# Patient Record
Sex: MALE | Race: BLACK OR AFRICAN AMERICAN | NOT HISPANIC OR LATINO | Employment: UNEMPLOYED | URBAN - METROPOLITAN AREA
[De-identification: names, ages, dates, MRNs, and addresses within clinical notes are randomized per-mention and may not be internally consistent; named-entity substitution may affect disease eponyms.]

---

## 2018-11-15 ENCOUNTER — HOSPITAL ENCOUNTER (OUTPATIENT)
Facility: HOSPITAL | Age: 38
Discharge: HOME | DRG: 917 | End: 2018-11-16
Attending: EMERGENCY MEDICINE | Admitting: INTERNAL MEDICINE
Payer: MEDICAID

## 2018-11-15 DIAGNOSIS — F19.10 POLYSUBSTANCE ABUSE: ICD-10-CM

## 2018-11-15 DIAGNOSIS — G62.9 NEUROPATHY: ICD-10-CM

## 2018-11-15 DIAGNOSIS — G93.40 ACUTE ENCEPHALOPATHY: Primary | ICD-10-CM

## 2018-11-15 DIAGNOSIS — T40.2X1A OPIOID OVERDOSE, ACCIDENTAL OR UNINTENTIONAL, INITIAL ENCOUNTER: ICD-10-CM

## 2018-11-15 DIAGNOSIS — Z72.0 TOBACCO ABUSE: ICD-10-CM

## 2018-11-15 DIAGNOSIS — D64.9 NORMOCYTIC ANEMIA: ICD-10-CM

## 2018-11-15 DIAGNOSIS — M54.32 SCIATICA OF LEFT SIDE: ICD-10-CM

## 2018-11-15 DIAGNOSIS — T40.2X1A OPIOID OVERDOSE: ICD-10-CM

## 2018-11-15 DIAGNOSIS — R41.82 ALTERED MENTAL STATUS: ICD-10-CM

## 2018-11-15 LAB
ALBUMIN SERPL BCP-MCNC: 4 G/DL
ALP SERPL-CCNC: 74 U/L
ALT SERPL W/O P-5'-P-CCNC: 21 U/L
AMPHET+METHAMPHET UR QL: NEGATIVE
ANION GAP SERPL CALC-SCNC: 7 MMOL/L
APAP SERPL-MCNC: <3 UG/ML
AST SERPL-CCNC: 26 U/L
BARBITURATES UR QL SCN>200 NG/ML: NEGATIVE
BASOPHILS # BLD AUTO: 0.02 K/UL
BASOPHILS NFR BLD: 0.4 %
BENZODIAZ UR QL SCN>200 NG/ML: NEGATIVE
BILIRUB SERPL-MCNC: 0.7 MG/DL
BUN SERPL-MCNC: 10 MG/DL
BZE UR QL SCN: NORMAL
CALCIUM SERPL-MCNC: 9 MG/DL
CANNABINOIDS UR QL SCN: NEGATIVE
CHLORIDE SERPL-SCNC: 106 MMOL/L
CO2 SERPL-SCNC: 27 MMOL/L
CREAT SERPL-MCNC: 1.2 MG/DL
CREAT UR-MCNC: 162 MG/DL
DIFFERENTIAL METHOD: ABNORMAL
EOSINOPHIL # BLD AUTO: 0.1 K/UL
EOSINOPHIL NFR BLD: 2.5 %
ERYTHROCYTE [DISTWIDTH] IN BLOOD BY AUTOMATED COUNT: 12.8 %
EST. GFR  (AFRICAN AMERICAN): >60 ML/MIN/1.73 M^2
EST. GFR  (NON AFRICAN AMERICAN): >60 ML/MIN/1.73 M^2
ETHANOL SERPL-MCNC: <10 MG/DL
GLUCOSE SERPL-MCNC: 96 MG/DL
HCT VFR BLD AUTO: 35.2 %
HGB BLD-MCNC: 11.3 G/DL
LYMPHOCYTES # BLD AUTO: 1.6 K/UL
LYMPHOCYTES NFR BLD: 33.1 %
MCH RBC QN AUTO: 27.6 PG
MCHC RBC AUTO-ENTMCNC: 32.1 G/DL
MCV RBC AUTO: 86 FL
METHADONE UR QL SCN>300 NG/ML: NEGATIVE
MONOCYTES # BLD AUTO: 0.6 K/UL
MONOCYTES NFR BLD: 12.1 %
NEUTROPHILS # BLD AUTO: 2.5 K/UL
NEUTROPHILS NFR BLD: 51.7 %
OPIATES UR QL SCN: NORMAL
PCP UR QL SCN>25 NG/ML: NEGATIVE
PLATELET # BLD AUTO: 208 K/UL
PMV BLD AUTO: 11.3 FL
POCT GLUCOSE: 112 MG/DL (ref 70–110)
POTASSIUM SERPL-SCNC: 3.9 MMOL/L
PROT SERPL-MCNC: 6.4 G/DL
RBC # BLD AUTO: 4.1 M/UL
SALICYLATES SERPL-MCNC: <5 MG/DL
SODIUM SERPL-SCNC: 140 MMOL/L
TOXICOLOGY INFORMATION: NORMAL
TSH SERPL DL<=0.005 MIU/L-ACNC: 0.99 UIU/ML
WBC # BLD AUTO: 4.8 K/UL

## 2018-11-15 PROCEDURE — 63600175 PHARM REV CODE 636 W HCPCS: Performed by: NURSE PRACTITIONER

## 2018-11-15 PROCEDURE — 20000000 HC ICU ROOM

## 2018-11-15 PROCEDURE — 85025 COMPLETE CBC W/AUTO DIFF WBC: CPT

## 2018-11-15 PROCEDURE — 80320 DRUG SCREEN QUANTALCOHOLS: CPT

## 2018-11-15 PROCEDURE — 80307 DRUG TEST PRSMV CHEM ANLYZR: CPT

## 2018-11-15 PROCEDURE — 80053 COMPREHEN METABOLIC PANEL: CPT

## 2018-11-15 PROCEDURE — 25000003 PHARM REV CODE 250: Performed by: EMERGENCY MEDICINE

## 2018-11-15 PROCEDURE — 80329 ANALGESICS NON-OPIOID 1 OR 2: CPT

## 2018-11-15 PROCEDURE — G0378 HOSPITAL OBSERVATION PER HR: HCPCS

## 2018-11-15 PROCEDURE — 96375 TX/PRO/DX INJ NEW DRUG ADDON: CPT

## 2018-11-15 PROCEDURE — 82728 ASSAY OF FERRITIN: CPT

## 2018-11-15 PROCEDURE — 96372 THER/PROPH/DIAG INJ SC/IM: CPT

## 2018-11-15 PROCEDURE — 96365 THER/PROPH/DIAG IV INF INIT: CPT

## 2018-11-15 PROCEDURE — 83540 ASSAY OF IRON: CPT

## 2018-11-15 PROCEDURE — 93005 ELECTROCARDIOGRAM TRACING: CPT

## 2018-11-15 PROCEDURE — 63600175 PHARM REV CODE 636 W HCPCS: Performed by: EMERGENCY MEDICINE

## 2018-11-15 PROCEDURE — 84443 ASSAY THYROID STIM HORMONE: CPT

## 2018-11-15 PROCEDURE — 96376 TX/PRO/DX INJ SAME DRUG ADON: CPT

## 2018-11-15 PROCEDURE — 96361 HYDRATE IV INFUSION ADD-ON: CPT

## 2018-11-15 PROCEDURE — 83036 HEMOGLOBIN GLYCOSYLATED A1C: CPT

## 2018-11-15 PROCEDURE — 99285 EMERGENCY DEPT VISIT HI MDM: CPT | Mod: 25

## 2018-11-15 RX ORDER — GLUCAGON 1 MG
1 KIT INJECTION
Status: DISCONTINUED | OUTPATIENT
Start: 2018-11-15 | End: 2018-11-16 | Stop reason: HOSPADM

## 2018-11-15 RX ORDER — IBUPROFEN 200 MG
24 TABLET ORAL
Status: DISCONTINUED | OUTPATIENT
Start: 2018-11-15 | End: 2018-11-16 | Stop reason: HOSPADM

## 2018-11-15 RX ORDER — SODIUM CHLORIDE 0.9 % (FLUSH) 0.9 %
5 SYRINGE (ML) INJECTION
Status: DISCONTINUED | OUTPATIENT
Start: 2018-11-15 | End: 2018-11-16 | Stop reason: HOSPADM

## 2018-11-15 RX ORDER — NALOXONE HCL 0.4 MG/ML
0.4 VIAL (ML) INJECTION
Status: COMPLETED | OUTPATIENT
Start: 2018-11-15 | End: 2018-11-15

## 2018-11-15 RX ORDER — IBUPROFEN 200 MG
16 TABLET ORAL
Status: DISCONTINUED | OUTPATIENT
Start: 2018-11-15 | End: 2018-11-16 | Stop reason: HOSPADM

## 2018-11-15 RX ADMIN — NALOXONE HYDROCHLORIDE 0.4 MG/HR: 1 INJECTION PARENTERAL at 09:11

## 2018-11-15 RX ADMIN — NALOXONE HYDROCHLORIDE 0.4 MG: 0.4 INJECTION, SOLUTION INTRAMUSCULAR; INTRAVENOUS; SUBCUTANEOUS at 07:11

## 2018-11-15 RX ADMIN — SODIUM CHLORIDE 1000 ML: 0.9 INJECTION, SOLUTION INTRAVENOUS at 04:11

## 2018-11-15 RX ADMIN — NALOXONE HYDROCHLORIDE 0.4 MG: 0.4 INJECTION, SOLUTION INTRAMUSCULAR; INTRAVENOUS; SUBCUTANEOUS at 04:11

## 2018-11-15 RX ADMIN — NALOXONE HYDROCHLORIDE 0.4 MG: 0.4 INJECTION, SOLUTION INTRAMUSCULAR; INTRAVENOUS; SUBCUTANEOUS at 05:11

## 2018-11-15 NOTE — ED NOTES
Pt presents to the ED w/ c/o of overdose. Pt arrives via personal transport unresponsive. Pt unresponsive to painful stimuli. Wife with pt reports that pt did heroin 1hr pta. Dr. Ordonez at bedside at this time.

## 2018-11-15 NOTE — ED NOTES
Pt is drowsy. Pt connected to cardiac monitor. Wife and mother at bedside. Wife states family can come to pt's room but advised not to give any medical information to family members.
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
## 2018-11-15 NOTE — ED NOTES
3rd dose of 0.4mg of narcan given at this time per Dr. Ordonez verbal order. Pt is drowsy. Pt responds to verbal stimulation.

## 2018-11-15 NOTE — ED NOTES
Pt is drowsy/sleeping at this time. Pt vitals stable. Dr. Ordonez at bedside. Will continue to monitor pt at this time.

## 2018-11-16 VITALS
SYSTOLIC BLOOD PRESSURE: 139 MMHG | HEIGHT: 68 IN | BODY MASS INDEX: 24.72 KG/M2 | DIASTOLIC BLOOD PRESSURE: 78 MMHG | OXYGEN SATURATION: 99 % | RESPIRATION RATE: 14 BRPM | WEIGHT: 163.13 LBS | TEMPERATURE: 98 F | HEART RATE: 77 BPM

## 2018-11-16 LAB
ALBUMIN SERPL BCP-MCNC: 3.8 G/DL
ALP SERPL-CCNC: 70 U/L
ALT SERPL W/O P-5'-P-CCNC: 18 U/L
ANION GAP SERPL CALC-SCNC: 8 MMOL/L
AST SERPL-CCNC: 21 U/L
BASOPHILS # BLD AUTO: 0.01 K/UL
BASOPHILS NFR BLD: 0.2 %
BILIRUB SERPL-MCNC: 1.4 MG/DL
BUN SERPL-MCNC: 10 MG/DL
CALCIUM SERPL-MCNC: 9.1 MG/DL
CHLORIDE SERPL-SCNC: 108 MMOL/L
CO2 SERPL-SCNC: 26 MMOL/L
CREAT SERPL-MCNC: 1.2 MG/DL
DIFFERENTIAL METHOD: ABNORMAL
EOSINOPHIL # BLD AUTO: 0.1 K/UL
EOSINOPHIL NFR BLD: 2.3 %
ERYTHROCYTE [DISTWIDTH] IN BLOOD BY AUTOMATED COUNT: 12.8 %
EST. GFR  (AFRICAN AMERICAN): >60 ML/MIN/1.73 M^2
EST. GFR  (NON AFRICAN AMERICAN): >60 ML/MIN/1.73 M^2
ESTIMATED AVG GLUCOSE: 103 MG/DL
FERRITIN SERPL-MCNC: 58 NG/ML
FOLATE SERPL-MCNC: 11.7 NG/ML
GLUCOSE SERPL-MCNC: 91 MG/DL
HBA1C MFR BLD HPLC: 5.2 %
HCT VFR BLD AUTO: 36.3 %
HGB BLD-MCNC: 11.8 G/DL
IRON SERPL-MCNC: 82 UG/DL
LYMPHOCYTES # BLD AUTO: 1.2 K/UL
LYMPHOCYTES NFR BLD: 25.8 %
MCH RBC QN AUTO: 27.8 PG
MCHC RBC AUTO-ENTMCNC: 32.5 G/DL
MCV RBC AUTO: 86 FL
MONOCYTES # BLD AUTO: 0.4 K/UL
MONOCYTES NFR BLD: 9.2 %
NEUTROPHILS # BLD AUTO: 3 K/UL
NEUTROPHILS NFR BLD: 62.5 %
PLATELET # BLD AUTO: 207 K/UL
PMV BLD AUTO: 11 FL
POTASSIUM SERPL-SCNC: 3.5 MMOL/L
PROT SERPL-MCNC: 6 G/DL
RBC # BLD AUTO: 4.24 M/UL
SATURATED IRON: 24 %
SODIUM SERPL-SCNC: 142 MMOL/L
TOTAL IRON BINDING CAPACITY: 343 UG/DL
TRANSFERRIN SERPL-MCNC: 232 MG/DL
VIT B12 SERPL-MCNC: 417 PG/ML
WBC # BLD AUTO: 4.77 K/UL

## 2018-11-16 PROCEDURE — 80053 COMPREHEN METABOLIC PANEL: CPT

## 2018-11-16 PROCEDURE — 36415 COLL VENOUS BLD VENIPUNCTURE: CPT

## 2018-11-16 PROCEDURE — 63600175 PHARM REV CODE 636 W HCPCS: Performed by: EMERGENCY MEDICINE

## 2018-11-16 PROCEDURE — 85025 COMPLETE CBC W/AUTO DIFF WBC: CPT

## 2018-11-16 PROCEDURE — 25000003 PHARM REV CODE 250: Performed by: EMERGENCY MEDICINE

## 2018-11-16 PROCEDURE — 82607 VITAMIN B-12: CPT

## 2018-11-16 PROCEDURE — 82746 ASSAY OF FOLIC ACID SERUM: CPT

## 2018-11-16 PROCEDURE — 94761 N-INVAS EAR/PLS OXIMETRY MLT: CPT

## 2018-11-16 PROCEDURE — G0378 HOSPITAL OBSERVATION PER HR: HCPCS

## 2018-11-16 RX ORDER — GABAPENTIN 300 MG/1
900 CAPSULE ORAL 3 TIMES DAILY
Qty: 270 CAPSULE | Refills: 11 | Status: SHIPPED | OUTPATIENT
Start: 2018-11-16 | End: 2019-11-16

## 2018-11-16 RX ADMIN — NALOXONE HYDROCHLORIDE 0.6 MG/HR: 1 INJECTION PARENTERAL at 01:11

## 2018-11-16 RX ADMIN — NALOXONE HYDROCHLORIDE 0.6 MG/HR: 1 INJECTION PARENTERAL at 04:11

## 2018-11-16 NOTE — ED NOTES
Siderails up x 2.  Callbell within reach. Pt on cardiac monitor, automatic blood pressure cuff and pulse ox.

## 2018-11-16 NOTE — PLAN OF CARE
Problem: Patient Care Overview  Goal: Plan of Care Review  Outcome: Ongoing (interventions implemented as appropriate)   11/16/18 1670   Coping/Psychosocial   Plan Of Care Reviewed With patient   Patient received from ED this shift. Drowsy, but easily aroused and oriented. Remains on narcan gtt. No resp distress on room air. Vitals stable. No family at bedside. Continuous monitoring maintained.

## 2018-11-16 NOTE — NURSING
Upon assessment, pt IV that was infusing narcan gtt found to be out. Linens wet. New IV started. Notified Dr Newby of patient being off of narcan gtt for period of time (possibly 1.5 hrs since last assessment) and doing well. Will hold for now and evaluate.

## 2018-11-16 NOTE — ED PROVIDER NOTES
Encounter Date: 11/15/2018       History     Chief Complaint   Patient presents with    Drug Overdose     37 year old male presents to ed unresponsive after heroin use 1 hour pta.      HPI  Review of patient's allergies indicates:   Allergen Reactions    Bee pollens      Past Medical History:   Diagnosis Date    Asthma     Neuropathy     Renal disorder     Stroke      Past Surgical History:   Procedure Laterality Date    ABDOMINAL SURGERY       History reviewed. No pertinent family history.  Social History     Tobacco Use    Smoking status: Current Every Day Smoker     Packs/day: 0.50   Substance Use Topics    Alcohol use: No    Drug use: No     Review of Systems    Physical Exam     Initial Vitals [11/15/18 1645]   BP Pulse Resp Temp SpO2   (!) 155/84 (!) 130 20 98 °F (36.7 °C) 100 %      MAP       --         Physical Exam    ED Course   Procedures  Labs Reviewed   CBC W/ AUTO DIFFERENTIAL - Abnormal; Notable for the following components:       Result Value    RBC 4.10 (*)     Hemoglobin 11.3 (*)     Hematocrit 35.2 (*)     All other components within normal limits   COMPREHENSIVE METABOLIC PANEL - Abnormal; Notable for the following components:    Anion Gap 7 (*)     All other components within normal limits   ACETAMINOPHEN LEVEL - Abnormal; Notable for the following components:    Acetaminophen (Tylenol), Serum <3.0 (*)     All other components within normal limits   SALICYLATE LEVEL - Abnormal; Notable for the following components:    Salicylate Lvl <5.0 (*)     All other components within normal limits   POCT GLUCOSE - Abnormal; Notable for the following components:    POCT Glucose 112 (*)     All other components within normal limits   DRUG SCREEN PANEL, URINE EMERGENCY   ALCOHOL,MEDICAL (ETHANOL)   TSH   SALICYLATE LEVEL   HEMOGLOBIN A1C   IRON AND TIBC   FERRITIN   VITAMIN B12   FOLATE   POCT GLUCOSE MONITORING CONTINUOUS          Imaging Results    None                            ED Course as of  Nov 15 2233   Thu Nov 15, 2018   1649 Patient immediately A&Ox3  after narcan administration.  [LD]   1653 HR improved after narcan Pulse: 104 [LD]   1653 Patient getting drowsy again.  Will give another dose of narcan    [LD]   1933 Patient is awake and answering questions.  Admits to using heroin about 3-4 PM today.  Denies any attempts to hurt himself.    [LD]      ED Course User Index  [LD] Irena Ordonez MD     Clinical Impression:   {Add your Clinical Impression here. If you haven't documented one yet, please pend the note, finalize a Clinical Impression, and refresh your note before signing.:[id number]}

## 2018-11-16 NOTE — ED NOTES
Pt sleeping. Pt responding to vigorous stimulation at this time. Family at bedside. Pt on cardiac monitor. Will continue to monitor.

## 2018-11-16 NOTE — ED NOTES
Eyes closed.  Respirations even and unlabored.  Opens eyes to tactile stimuli. Very drowsy.  Dr. Mays notified.
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
## 2018-11-16 NOTE — ED NOTES
Pt and wife ask for information not be given to family members as to why the patient is in the ED. RN understood and MD informed.

## 2018-11-16 NOTE — ED NOTES
Mother at bedside asking condition of pt. RN told mother that due to privacy of the patient RN could not give any information to the mother but that she can ask her son about his condition. Mother understood that RN could not disclose information about pt.

## 2018-11-16 NOTE — NURSING
Discharge paperwork given and IV's d/c'd, no signs of distress noted, denies any concerns. Will walk patient to the lobby and patient is leaving with family

## 2018-11-16 NOTE — PROGRESS NOTES
"[facility] Medicine Progress Note    Primary Team: [facility] Hospitalist Team A  Attending Physician: Brea Rowe MD  Resident: Danica Rowe  Intern: Ginna Crabtree    Subjective:      Overnight narcan gtt stopped due to IV access coming out.  Patient more alert and responsive, interactive with staff. This morning, patient with no acute complaints. Denies any symptoms of withdrawal, wanting to go home this morning.  No diarrhea, abdominal pain, yawning, chest pain this AM.     Objective:     Last 24 Hour Vital Signs:  BP  Min: 110/56  Max: 155/84  Temp  Av.7 °F (37.1 °C)  Min: 98 °F (36.7 °C)  Max: 99.3 °F (37.4 °C)  Pulse  Av.7  Min: 60  Max: 130  Resp  Av.3  Min: 11  Max: 29  SpO2  Av.5 %  Min: 95 %  Max: 100 %  Height  Av' 8" (172.7 cm)  Min: 5' 8" (172.7 cm)  Max: 5' 8" (172.7 cm)  Weight  Av.6 kg (166 lb 9.1 oz)  Min: 74 kg (163 lb 2.3 oz)  Max: 77.1 kg (170 lb)  I/O last 3 completed shifts:  In: 199.8 [I.V.:199.8]  Out: 1000 [Urine:1000]    Physical Examination:  General: alert, awake, oriented x 4, disheveled   HEENT: normocephalic, EOMI, PERRL, mmm  Neck: no lymphadenopathy, trachea midline   CV: RRR, no murmurs, rubs, or gallops  Lungs: CTAB, No crackles or wheezing  GI : soft, non tender, non distended, normoactive bowel sounds, midline abdominal scar   Extrem: no edema, clubbing, or cyanosis  Skin: diaphoretic and moist, warm, intact  Neuro: AAOx3 after narcan drip, moving all extremities     Laboratory:  Laboratory Data Reviewed: yes  Pertinent Findings:  H/H 11.3/35.2  Iron panel, ferritin wnl  Hgb A1c wnl  CMP wnl  UDS: +cocaine, +opiates  TSH wnl    Microbiology Data Reviewed: yes  Pertinent Findings:  none    Other Results:  EKG (my interpretation): normal sinus rhythm, L atrial enlargement, LVH by voltage (high voltage tracing)    Radiology Data Reviewed: yes  Pertinent Findings:  none    Current Medications:     Infusions:   naloxone (NARCAN) infusion Stopped " (11/16/18 5565)        Scheduled:       PRN:  dextrose 50%, dextrose 50%, glucagon (human recombinant), glucose, glucose, influenza, sodium chloride 0.9%    Antibiotics and Day Number of Therapy:  none    Lines and Day Number of Therapy:  PIV    Assessment:     Chris Rogel is a 37 y.o.male with  Patient Active Problem List    Diagnosis Date Noted    Opioid overdose 11/15/2018    Acute encephalopathy 11/15/2018    Altered mental status 11/15/2018    Left foot drop 10/24/2013    Sciatica of left side 10/24/2013    Acute kidney failure 09/30/2013    Rhabdomyolysis 09/30/2013        Plan:       Acute Toxic Encephalopathy 2/2 Opiate overdose  - has been using prescription opiates recreationally for a while, wife says he tried heroin for first time tonight   - heroin earlier on day of admission, became non responsive and brought to ED  - received IV narcan x 5 doses in ED, still would become somnolent and non responsive  - s/p naloxone infusion in ICU, now more responsive  - will transfer to floor, monitor for signs of withdrawal     Polysubstance abuse, tobacco abuse  - used heroin earlier, has been abusing prescription opiates, tox also positive for cocaine, smokes 1/2 pack cigarettes daily  - counseled on cessation  - will monitor for symptoms of withdrawal and treat symptomatically if needed   - he has children and is open to discussion about quitting drug use for his kids     Normocytic Anemia  - H/H 11.3/35.2, MCV 86  - iron studies pending   - no active bleeding     F: none  E: stable  N: regular  Ppx: lovenox  Dispo:likely discharge today    Yonny Newby MD  [facility] Internal Medicine HO-1    [facility] Medicine Hospitalist Pager numbers:   [facility] Hospitalist Medicine Team A (Susan/Jae): [phone]  [facility] Hospitalist Medicine Team B (Hari/Keena):  [phone]

## 2018-11-16 NOTE — ED NOTES
Drowsy.  Opens eyes with tactile stimuli. Pt not answering questions.  Family members at bedside.  Dr. Ordonez notified.

## 2018-11-27 ENCOUNTER — HOSPITAL ENCOUNTER (EMERGENCY)
Facility: HOSPITAL | Age: 38
Discharge: HOME | End: 2018-11-27
Attending: EMERGENCY MEDICINE

## 2018-11-27 VITALS
WEIGHT: 160 LBS | RESPIRATION RATE: 16 BRPM | DIASTOLIC BLOOD PRESSURE: 81 MMHG | HEART RATE: 80 BPM | OXYGEN SATURATION: 100 % | HEIGHT: 68 IN | SYSTOLIC BLOOD PRESSURE: 140 MMHG | TEMPERATURE: 99 F | BODY MASS INDEX: 24.25 KG/M2

## 2018-11-27 DIAGNOSIS — R52 GENERALIZED BODY ACHES: ICD-10-CM

## 2018-11-27 DIAGNOSIS — R19.7 DIARRHEA, UNSPECIFIED TYPE: Primary | ICD-10-CM

## 2018-11-27 LAB
ALBUMIN SERPL BCP-MCNC: 3.8 G/DL
ALP SERPL-CCNC: 75 U/L
ALT SERPL W/O P-5'-P-CCNC: 23 U/L
AMPHET+METHAMPHET UR QL: NEGATIVE
ANION GAP SERPL CALC-SCNC: 7 MMOL/L
AST SERPL-CCNC: 27 U/L
BARBITURATES UR QL SCN>200 NG/ML: NEGATIVE
BASOPHILS # BLD AUTO: 0.02 K/UL
BASOPHILS NFR BLD: 0.3 %
BENZODIAZ UR QL SCN>200 NG/ML: NEGATIVE
BILIRUB SERPL-MCNC: 0.8 MG/DL
BILIRUB UR QL STRIP: NEGATIVE
BUN SERPL-MCNC: 13 MG/DL
BZE UR QL SCN: NEGATIVE
CALCIUM SERPL-MCNC: 9.3 MG/DL
CANNABINOIDS UR QL SCN: NEGATIVE
CHLORIDE SERPL-SCNC: 109 MMOL/L
CLARITY UR: CLEAR
CO2 SERPL-SCNC: 23 MMOL/L
COLOR UR: ABNORMAL
CREAT SERPL-MCNC: 1.2 MG/DL
CREAT UR-MCNC: 324.2 MG/DL
DIFFERENTIAL METHOD: ABNORMAL
EOSINOPHIL # BLD AUTO: 0 K/UL
EOSINOPHIL NFR BLD: 0.6 %
ERYTHROCYTE [DISTWIDTH] IN BLOOD BY AUTOMATED COUNT: 12.8 %
EST. GFR  (AFRICAN AMERICAN): >60 ML/MIN/1.73 M^2
EST. GFR  (NON AFRICAN AMERICAN): >60 ML/MIN/1.73 M^2
FLUAV AG SPEC QL IA: NEGATIVE
FLUBV AG SPEC QL IA: NEGATIVE
GLUCOSE SERPL-MCNC: 123 MG/DL
GLUCOSE UR QL STRIP: NEGATIVE
HCT VFR BLD AUTO: 42.3 %
HGB BLD-MCNC: 13.7 G/DL
HGB UR QL STRIP: NEGATIVE
KETONES UR QL STRIP: ABNORMAL
LACTATE SERPL-SCNC: 2.2 MMOL/L
LEUKOCYTE ESTERASE UR QL STRIP: NEGATIVE
LIPASE SERPL-CCNC: 36 U/L
LYMPHOCYTES # BLD AUTO: 0.9 K/UL
LYMPHOCYTES NFR BLD: 14.3 %
MCH RBC QN AUTO: 27.8 PG
MCHC RBC AUTO-ENTMCNC: 32.4 G/DL
MCV RBC AUTO: 86 FL
METHADONE UR QL SCN>300 NG/ML: NEGATIVE
MONOCYTES # BLD AUTO: 0.5 K/UL
MONOCYTES NFR BLD: 7.4 %
NEUTROPHILS # BLD AUTO: 4.9 K/UL
NEUTROPHILS NFR BLD: 77.2 %
NITRITE UR QL STRIP: NEGATIVE
OPIATES UR QL SCN: NORMAL
PCP UR QL SCN>25 NG/ML: NEGATIVE
PH UR STRIP: 7 [PH] (ref 5–8)
PLATELET # BLD AUTO: 221 K/UL
PMV BLD AUTO: 11.4 FL
POTASSIUM SERPL-SCNC: 3.6 MMOL/L
PROT SERPL-MCNC: 6.9 G/DL
PROT UR QL STRIP: ABNORMAL
RBC # BLD AUTO: 4.93 M/UL
SODIUM SERPL-SCNC: 139 MMOL/L
SP GR UR STRIP: 1.02 (ref 1–1.03)
SPECIMEN SOURCE: NORMAL
TOXICOLOGY INFORMATION: NORMAL
URN SPEC COLLECT METH UR: ABNORMAL
UROBILINOGEN UR STRIP-ACNC: NEGATIVE EU/DL
WBC # BLD AUTO: 6.38 K/UL

## 2018-11-27 PROCEDURE — 87400 INFLUENZA A/B EACH AG IA: CPT | Mod: 59

## 2018-11-27 PROCEDURE — 81003 URINALYSIS AUTO W/O SCOPE: CPT

## 2018-11-27 PROCEDURE — 83690 ASSAY OF LIPASE: CPT

## 2018-11-27 PROCEDURE — 25000003 PHARM REV CODE 250: Performed by: PHYSICIAN ASSISTANT

## 2018-11-27 PROCEDURE — 87040 BLOOD CULTURE FOR BACTERIA: CPT | Mod: 59

## 2018-11-27 PROCEDURE — 85025 COMPLETE CBC W/AUTO DIFF WBC: CPT

## 2018-11-27 PROCEDURE — 96374 THER/PROPH/DIAG INJ IV PUSH: CPT

## 2018-11-27 PROCEDURE — 80053 COMPREHEN METABOLIC PANEL: CPT

## 2018-11-27 PROCEDURE — 80307 DRUG TEST PRSMV CHEM ANLYZR: CPT

## 2018-11-27 PROCEDURE — 99284 EMERGENCY DEPT VISIT MOD MDM: CPT | Mod: 25

## 2018-11-27 PROCEDURE — 63600175 PHARM REV CODE 636 W HCPCS: Performed by: EMERGENCY MEDICINE

## 2018-11-27 PROCEDURE — 25000003 PHARM REV CODE 250: Performed by: EMERGENCY MEDICINE

## 2018-11-27 PROCEDURE — 96361 HYDRATE IV INFUSION ADD-ON: CPT

## 2018-11-27 PROCEDURE — 83605 ASSAY OF LACTIC ACID: CPT

## 2018-11-27 RX ORDER — ONDANSETRON 4 MG/1
4 TABLET, ORALLY DISINTEGRATING ORAL EVERY 6 HOURS PRN
Qty: 20 TABLET | Refills: 0 | Status: SHIPPED | OUTPATIENT
Start: 2018-11-27

## 2018-11-27 RX ORDER — ONDANSETRON 2 MG/ML
4 INJECTION INTRAMUSCULAR; INTRAVENOUS
Status: COMPLETED | OUTPATIENT
Start: 2018-11-27 | End: 2018-11-27

## 2018-11-27 RX ADMIN — ONDANSETRON 4 MG: 2 INJECTION INTRAMUSCULAR; INTRAVENOUS at 03:11

## 2018-11-27 RX ADMIN — SODIUM CHLORIDE 1000 ML: 0.9 INJECTION, SOLUTION INTRAVENOUS at 02:11

## 2018-11-27 RX ADMIN — LIDOCAINE HYDROCHLORIDE: 20 SOLUTION ORAL; TOPICAL at 03:11

## 2018-11-27 NOTE — ED TRIAGE NOTES
"Pt c/o diarrhea x "a couple of days." Pt denies blood in stool or N/V. Pt states, "I had a fever of 99.0 at home." Pt denies taking medications at home.   "

## 2018-11-27 NOTE — ED NOTES
Pt informed again to provide urine sample. Pt did not respond to staff and continued to talk on cell phone. Patient has not had diarrhea while in ED.

## 2018-11-27 NOTE — ED PROVIDER NOTES
Encounter Date: 11/27/2018       History     Chief Complaint   Patient presents with    Diarrhea     x 3 days body aches decreased po intake diffuse abd pain worse to lower abd denies nausea vomiting states fever at home 99 pt has not attempted any home medications     39 yo male with PMH of asthma, neuropathy, stroke, and opiod abuse presents to the ED with complaints of diarrhea, body aches, decreased appetite, and fever x 3 days. Patient state he is having 15 episodes per day of watery diarrhea. He has not eaten anything solid in about 3 days, but has been drinking water and poweraid. He states his fever has been around 99F. Also admits to nausea, decreased urine output, and generalized abdominal pain. Denies vomiting, dysuria.       The history is provided by the patient. No  was used.     Review of patient's allergies indicates:   Allergen Reactions    Bee pollens      Past Medical History:   Diagnosis Date    Asthma     Neuropathy     Renal disorder     Stroke      Past Surgical History:   Procedure Laterality Date    ABDOMINAL SURGERY       No family history on file.  Social History     Tobacco Use    Smoking status: Current Every Day Smoker     Packs/day: 0.50   Substance Use Topics    Alcohol use: No    Drug use: No     Review of Systems   Constitutional: Positive for appetite change and fever.   Gastrointestinal: Positive for abdominal pain, diarrhea and nausea. Negative for vomiting.   Genitourinary: Negative for dysuria and flank pain.   Musculoskeletal: Positive for myalgias.   All other systems reviewed and are negative.      Physical Exam     Initial Vitals [11/27/18 1342]   BP Pulse Resp Temp SpO2   123/86 87 18 98.8 °F (37.1 °C) 97 %      MAP       --         Physical Exam    Nursing note and vitals reviewed.  Constitutional: He appears well-developed and well-nourished. No distress.   HENT:   Head: Normocephalic and atraumatic.   Nose: Nose normal.   Eyes: Conjunctivae  are normal.   Neck: Normal range of motion.   Cardiovascular: Normal rate, regular rhythm and normal heart sounds.   Pulmonary/Chest: Effort normal and breath sounds normal.   Abdominal: Soft. Bowel sounds are normal. There is generalized tenderness. There is no rigidity, no guarding and no CVA tenderness.       Well healed vertical midline surgical scar   Musculoskeletal: Normal range of motion.   Neurological: He is alert and oriented to person, place, and time.   Skin: Skin is warm and dry.   Psychiatric: He has a normal mood and affect. His speech is normal and behavior is normal. Judgment and thought content normal. Cognition and memory are normal.         ED Course   Procedures  Labs Reviewed   CBC W/ AUTO DIFFERENTIAL - Abnormal; Notable for the following components:       Result Value    Hemoglobin 13.7 (*)     Lymph # 0.9 (*)     Gran% 77.2 (*)     Lymph% 14.3 (*)     All other components within normal limits   COMPREHENSIVE METABOLIC PANEL - Abnormal; Notable for the following components:    Glucose 123 (*)     Anion Gap 7 (*)     All other components within normal limits   URINALYSIS, REFLEX TO URINE CULTURE - Abnormal; Notable for the following components:    Color, UA Brown (*)     Protein, UA Trace (*)     Ketones, UA 1+ (*)     All other components within normal limits    Narrative:     Preferred Collection Type->Urine, Clean Catch   CULTURE, BLOOD   CULTURE, BLOOD   LIPASE   LACTIC ACID, PLASMA   INFLUENZA A AND B ANTIGEN   DRUG SCREEN PANEL, URINE EMERGENCY    Narrative:     Preferred Collection Type->Urine, Clean Catch          Imaging Results          X-Ray Chest PA And Lateral (Final result)  Result time 11/27/18 14:29:28    Final result by Edwin Bazan MD (11/27/18 14:29:28)                 Impression:      No acute cardiopulmonary process.      Electronically signed by: Edwin Bazan MD  Date:    11/27/2018  Time:    14:29             Narrative:    EXAMINATION:  XR CHEST PA AND  LATERAL    CLINICAL HISTORY:  Pain, unspecified    TECHNIQUE:  PA and lateral views of the chest were performed.    COMPARISON:  10/01/2013    FINDINGS:  There is no consolidation, effusion, or pneumothorax.  Cardiomediastinal silhouette is unremarkable.  Regional osseous structures are unremarkable.                                 Medical Decision Making:   Initial Assessment:   37 yo male with 3 days of diarrhea, nausea, generalized abdominal pain, decreased appetite, and low grade fever. abd with generalized tenderness. Exam is otherwise benign.   Differential Diagnosis:   Gastroenteritis, withdrawal, influenza, sepsis  Clinical Tests:   Lab Tests: Ordered and Reviewed  Radiological Study: Ordered and Reviewed  ED Management:  Flu negative. UA consistent with mild dehydration. Urine drug screen positive for opiates. IV fluids, GI cocktail given in ED. Patient has not had any diarrhea while in ED. He will be discharged with rx for zofran and instructions to continue aggressive oral hydration with water and Pedialyte or Gatorade. Follow up with PCP as soon as possible. Return precautions given. Patient states understanding and agreement with treatment plan.               Attending Attestation:     Physician Attestation Statement for NP/PA:   I have conducted a face to face encounter with this patient in addition to the NP/PA, due to NP/PA Request    Other NP/PA Attestation Additions:      Medical Decision Making: Patient here with diarrhea and generalized abdominal pain.  VSS, NAD nontoxic appearing.  Abd is soft and nontender.  No guarding or rebound.  ED workup nondiagnostic.  Tolerating PO well in ED.  No episodes of diarrhea in ED.                   ED Course as of Nov 27 1513   Tue Nov 27, 2018   1340 Sort note: Chris Rogel nontoxic/afebrile 38 y.o.  presented to the ED with c/o generalized weakness, abdominal pain and diarrhea for the past three days.     Patient seen and medically screened by Physician  assistant in Sort process due to ED crowding.  Appropriate tests and/or medications ordered.  Care transferred to an alternate provider when patient was placed in an Exam [facility] from the [facility] for physical exam, additional orders, and disposition. 1:40 PM. LC    [LC]      ED Course User Index  [LC] KARIME Christianson     Clinical Impression:   The primary encounter diagnosis was Diarrhea, unspecified type. A diagnosis of Generalized body aches was also pertinent to this visit.                             MARVIN PembertonC  11/27/18 1652       Irena Ordonez MD  12/01/18 4700

## 2018-11-29 ENCOUNTER — DOCUMENTATION ONLY (OUTPATIENT)
Dept: PHARMACY | Facility: CLINIC | Age: 38
End: 2018-11-29

## 2018-11-29 NOTE — PROGRESS NOTES
Attempted to qualify patient for [employer]. Patient denied due to active Medicaid. I gave billing information to pharmacy in order to process patient's prescriptions

## 2018-11-30 LAB
BACTERIA BLD CULT: NORMAL

## 2018-12-02 LAB — BACTERIA BLD CULT: NORMAL

## 2018-12-02 NOTE — ED PROVIDER NOTES
+Blood culture.  Attempted to contact the patient by phone X3 without success.  Certified letter sent.      DEA Alarcon  12/01/18 2033

## 2019-06-05 ENCOUNTER — HOSPITAL ENCOUNTER (EMERGENCY)
Facility: HOSPITAL | Age: 39
Discharge: HOME | End: 2019-06-05
Attending: EMERGENCY MEDICINE
Payer: MEDICAID

## 2019-06-05 VITALS
TEMPERATURE: 99 F | OXYGEN SATURATION: 97 % | RESPIRATION RATE: 18 BRPM | BODY MASS INDEX: 26.52 KG/M2 | WEIGHT: 175 LBS | DIASTOLIC BLOOD PRESSURE: 94 MMHG | SYSTOLIC BLOOD PRESSURE: 171 MMHG | HEART RATE: 94 BPM | HEIGHT: 68 IN

## 2019-06-05 DIAGNOSIS — I10 HYPERTENSION, UNSPECIFIED TYPE: ICD-10-CM

## 2019-06-05 DIAGNOSIS — R03.0 ELEVATED BLOOD PRESSURE READING: Primary | ICD-10-CM

## 2019-06-05 PROCEDURE — 99283 EMERGENCY DEPT VISIT LOW MDM: CPT

## 2019-06-05 RX ORDER — BENZTROPINE MESYLATE 1 MG/1
TABLET ORAL 2 TIMES DAILY
COMMUNITY

## 2019-06-05 RX ORDER — AMLODIPINE BESYLATE 10 MG/1
10 TABLET ORAL DAILY
Qty: 30 TABLET | Refills: 0 | Status: SHIPPED | OUTPATIENT
Start: 2019-06-05 | End: 2022-05-05

## 2019-06-05 NOTE — ED NOTES
"Pt. Currently in bathroom, knocked on door, pt. Still in bathroom states "I am using the bathroom."  "

## 2019-06-05 NOTE — ED PROVIDER NOTES
"Encounter Date: 6/5/2019    SCRIBE #1 NOTE: I, Aracely Hoffman, am scribing for, and in the presence of,  DARRIAN Lima. I have scribed the following portions of the note - Other sections scribed: HPI, ROS, PE.       History     Chief Complaint   Patient presents with    Hypertension     Reports going to non medical facility and in order to be admitted needs pressure regulated. Has no previous hx of hypertension.      CC: Hypertension    HPI: This 38 y.o male who has Asthma, Renal disorder, Neuropathy, and Stroke presents to the ED for an evaluation for elevated blood pressure for the past 2 weeks.  Patient reports he has been attempting to get into the re-entry program at [facility], but has not been able to due to his elevated blood pressure.  Patient reports his blood pressure was approximately 170/100 mmHg when he was checked at the program.  He denies fever, chills, nausea, emesis, diarrhea, abdominal pain, chest pain, back pain, shortness of breath, palpitations, neck pain, extremity numbness, extremity weakness, or any other associated symptoms.  He denies previous diagnosis of HTN, and reports BP readings have previously been normal up until 2 weeks ago.    The history is provided by the patient. No  was used.     Review of patient's allergies indicates:   Allergen Reactions    Bee pollens      Past Medical History:   Diagnosis Date    Asthma     Neuropathy     Renal disorder     Stroke      Past Surgical History:   Procedure Laterality Date    ABDOMINAL SURGERY       History reviewed. No pertinent family history.  Social History     Tobacco Use    Smoking status: Current Every Day Smoker     Packs/day: 0.50   Substance Use Topics    Alcohol use: No    Drug use: Yes     Comment: patient reports taking "a lot of pain pills"     Review of Systems   Constitutional: Negative for chills and fever.   HENT: Negative for ear pain and sore throat.    Eyes: Negative for pain. "   Respiratory: Negative for cough and shortness of breath.    Cardiovascular: Negative for chest pain, palpitations and leg swelling.   Gastrointestinal: Negative for abdominal pain, diarrhea, nausea and vomiting.   Genitourinary: Negative for dysuria.   Musculoskeletal: Negative for back pain and neck pain.   Skin: Negative for rash.   Neurological: Negative for dizziness and headaches.       Physical Exam     Initial Vitals [06/05/19 1142]   BP Pulse Resp Temp SpO2   (!) 171/94 94 18 98.9 °F (37.2 °C) 97 %      MAP       --         Physical Exam    Nursing note and vitals reviewed.  Constitutional: He appears well-developed and well-nourished. He is active and cooperative. He does not appear ill. No distress.   Looking at car pictures on cellphone throughout HPI and exam   HENT:   Head: Normocephalic and atraumatic.   Eyes: Pupils are equal, round, and reactive to light.   Neck: Neck supple.   Cardiovascular: Normal rate, regular rhythm, S1 normal, S2 normal and normal heart sounds.   Pulmonary/Chest: Effort normal and breath sounds normal. No respiratory distress.   Musculoskeletal: Normal range of motion. He exhibits no edema.   Neurological: He is alert and oriented to person, place, and time. He has normal strength. No cranial nerve deficit or sensory deficit.   Skin: Skin is warm and dry.   Psychiatric: He has a normal mood and affect.         ED Course   Procedures  Labs Reviewed - No data to display       Imaging Results    None          Medical Decision Making:   History:   Old Medical Records: I decided to obtain old medical records.  Old Records Summarized: records from clinic visits and records from previous admission(s).       <> Summary of Records: Recent visits at other facilities in April and May of 2019 with normal blood pressure readings. Creatinine 1.2 on 11/27/2018.    Differential Diagnosis:   This is an urgent evaluation of a 38 year old male that presents to the ER for evaluation of elevated  blood pressure.  He reports having multiple high readings in the past 2 weeks while attempting to join a re-entry program; was formerly incarcerated and released several months ago.  This is a new finding; he had visits at [facility] and [facility] within the past 2 months that documented normal BP readings.  BP at triage today 171/94 mmHg. Repeat BP during exam ranged from 159/100, 165/99. He is completely asymptomatic and denies any associated symptoms or complaints.  I am not concerned for hypertensive urgency, ACS, intracranial bleed/CVA. No indication for workup.  I believe the pt is stable for outpatient treatment. I will start him on norvasc 10 mg daily and recommended that he follow up with his primary care doctor within 2-3 days for re-evaluation of his BP.  Return precautions were given.  Case discussed with attending, , and he is in agreement with plan.                        Clinical Impression:       ICD-10-CM ICD-9-CM   1. Elevated blood pressure reading R03.0 796.2   2. Hypertension, unspecified type I10 401.9         Disposition:   Disposition: Discharged  Condition: Stable                        Jahaira So NP  06/05/19 6788

## 2019-06-05 NOTE — ED TRIAGE NOTES
"Patient presents to the ED via personal transportation alone. Patient reports that he is trying to get admitted in to [facility] and has been having elevated blood pressure "all week".  Patient denies headaches, dizziness, weakness, changes in vision, difficulty speaking. Reports BP reading was  177/106 today. Patient states the facility center that he is trying to enter referred him to the ED.            "

## 2019-06-05 NOTE — DISCHARGE INSTRUCTIONS
Please take Norvasc once daily to help control your blood pressure.    Please follow-up with your primary care doctor within 1 week for re-evaluation of your blood pressure.    Return to the emergency room for any new or worsening symptoms or concerns.

## 2019-06-24 ENCOUNTER — OCCUPATIONAL HEALTH (OUTPATIENT)
Dept: URGENT CARE | Facility: CLINIC | Age: 39
End: 2019-06-24

## 2019-06-24 DIAGNOSIS — Z00.00 ANNUAL PHYSICAL EXAM: Primary | ICD-10-CM

## 2019-06-24 PROCEDURE — 86580 POCT TB SKIN TEST: ICD-10-PCS | Mod: S$GLB,,, | Performed by: PHYSICIAN ASSISTANT

## 2019-06-24 PROCEDURE — 86580 TB INTRADERMAL TEST: CPT | Mod: S$GLB,,, | Performed by: PHYSICIAN ASSISTANT

## 2022-03-31 PROBLEM — R31.0 GROSS HEMATURIA: Status: ACTIVE | Noted: 2022-03-31

## 2022-03-31 PROBLEM — F39 MOOD DISORDER: Status: ACTIVE | Noted: 2022-03-31

## 2022-05-05 PROBLEM — R35.1 NOCTURIA: Status: ACTIVE | Noted: 2022-05-05

## 2024-12-10 ENCOUNTER — HOSPITAL ENCOUNTER (EMERGENCY)
Facility: OTHER | Age: 44
Discharge: HOME | End: 2024-12-10
Attending: EMERGENCY MEDICINE
Payer: MEDICAID

## 2024-12-10 VITALS
SYSTOLIC BLOOD PRESSURE: 125 MMHG | DIASTOLIC BLOOD PRESSURE: 73 MMHG | RESPIRATION RATE: 14 BRPM | TEMPERATURE: 98 F | HEART RATE: 92 BPM | OXYGEN SATURATION: 98 %

## 2024-12-10 DIAGNOSIS — M24.849 THUMB JOINT LOCKING: ICD-10-CM

## 2024-12-10 DIAGNOSIS — R56.9 SEIZURE-LIKE ACTIVITY: Primary | ICD-10-CM

## 2024-12-10 LAB
ALBUMIN SERPL BCP-MCNC: 4.2 G/DL (ref 3.5–5.2)
ALP SERPL-CCNC: 98 U/L (ref 40–150)
ALT SERPL W/O P-5'-P-CCNC: 26 U/L (ref 10–44)
AMPHET+METHAMPHET UR QL: NEGATIVE
ANION GAP SERPL CALC-SCNC: 9 MMOL/L (ref 8–16)
AST SERPL-CCNC: 26 U/L (ref 10–40)
BARBITURATES UR QL SCN>200 NG/ML: NEGATIVE
BASOPHILS # BLD AUTO: 0.04 K/UL (ref 0–0.2)
BASOPHILS NFR BLD: 1 % (ref 0–1.9)
BENZODIAZ UR QL SCN>200 NG/ML: NEGATIVE
BILIRUB SERPL-MCNC: 0.3 MG/DL (ref 0.1–1)
BUN SERPL-MCNC: 21 MG/DL (ref 6–20)
BZE UR QL SCN: NEGATIVE
CALCIUM SERPL-MCNC: 8.7 MG/DL (ref 8.7–10.5)
CANNABINOIDS UR QL SCN: NEGATIVE
CHLORIDE SERPL-SCNC: 107 MMOL/L (ref 95–110)
CO2 SERPL-SCNC: 23 MMOL/L (ref 23–29)
CREAT SERPL-MCNC: 1.2 MG/DL (ref 0.5–1.4)
CREAT UR-MCNC: 207.6 MG/DL (ref 23–375)
DIFFERENTIAL METHOD BLD: ABNORMAL
EOSINOPHIL # BLD AUTO: 0.1 K/UL (ref 0–0.5)
EOSINOPHIL NFR BLD: 1.3 % (ref 0–8)
ERYTHROCYTE [DISTWIDTH] IN BLOOD BY AUTOMATED COUNT: 12.4 % (ref 11.5–14.5)
EST. GFR  (NO RACE VARIABLE): >60 ML/MIN/1.73 M^2
ETHANOL SERPL-MCNC: <10 MG/DL
GLUCOSE SERPL-MCNC: 102 MG/DL (ref 70–110)
HCT VFR BLD AUTO: 32.9 % (ref 40–54)
HGB BLD-MCNC: 11 G/DL (ref 14–18)
IMM GRANULOCYTES # BLD AUTO: 0 K/UL (ref 0–0.04)
IMM GRANULOCYTES NFR BLD AUTO: 0 % (ref 0–0.5)
LYMPHOCYTES # BLD AUTO: 0.7 K/UL (ref 1–4.8)
LYMPHOCYTES NFR BLD: 18.4 % (ref 18–48)
MAGNESIUM SERPL-MCNC: 1.8 MG/DL (ref 1.6–2.6)
MCH RBC QN AUTO: 29.2 PG (ref 27–31)
MCHC RBC AUTO-ENTMCNC: 33.4 G/DL (ref 32–36)
MCV RBC AUTO: 87 FL (ref 82–98)
METHADONE UR QL SCN>300 NG/ML: NEGATIVE
MONOCYTES # BLD AUTO: 0.4 K/UL (ref 0.3–1)
MONOCYTES NFR BLD: 10.3 % (ref 4–15)
NEUTROPHILS # BLD AUTO: 2.7 K/UL (ref 1.8–7.7)
NEUTROPHILS NFR BLD: 69 % (ref 38–73)
NRBC BLD-RTO: 0 /100 WBC
OHS QRS DURATION: 96 MS
OHS QTC CALCULATION: 418 MS
OPIATES UR QL SCN: NEGATIVE
PCP UR QL SCN>25 NG/ML: NEGATIVE
PLATELET # BLD AUTO: 165 K/UL (ref 150–450)
PMV BLD AUTO: 11.7 FL (ref 9.2–12.9)
POCT GLUCOSE: 115 MG/DL (ref 70–110)
POTASSIUM SERPL-SCNC: 4.2 MMOL/L (ref 3.5–5.1)
PROT SERPL-MCNC: 6.3 G/DL (ref 6–8.4)
RBC # BLD AUTO: 3.77 M/UL (ref 4.6–6.2)
SODIUM SERPL-SCNC: 139 MMOL/L (ref 136–145)
TOXICOLOGY INFORMATION: NORMAL
WBC # BLD AUTO: 3.97 K/UL (ref 3.9–12.7)

## 2024-12-10 PROCEDURE — 99285 EMERGENCY DEPT VISIT HI MDM: CPT | Mod: 25

## 2024-12-10 PROCEDURE — 93005 ELECTROCARDIOGRAM TRACING: CPT

## 2024-12-10 PROCEDURE — 80053 COMPREHEN METABOLIC PANEL: CPT | Performed by: EMERGENCY MEDICINE

## 2024-12-10 PROCEDURE — 83735 ASSAY OF MAGNESIUM: CPT | Performed by: EMERGENCY MEDICINE

## 2024-12-10 PROCEDURE — 80354 DRUG SCREENING FENTANYL: CPT | Performed by: EMERGENCY MEDICINE

## 2024-12-10 PROCEDURE — 82077 ASSAY SPEC XCP UR&BREATH IA: CPT | Performed by: EMERGENCY MEDICINE

## 2024-12-10 PROCEDURE — 63600175 PHARM REV CODE 636 W HCPCS: Performed by: EMERGENCY MEDICINE

## 2024-12-10 PROCEDURE — 25000003 PHARM REV CODE 250: Performed by: EMERGENCY MEDICINE

## 2024-12-10 PROCEDURE — 93010 ELECTROCARDIOGRAM REPORT: CPT | Mod: ,,, | Performed by: INTERNAL MEDICINE

## 2024-12-10 PROCEDURE — 80307 DRUG TEST PRSMV CHEM ANLYZR: CPT | Performed by: EMERGENCY MEDICINE

## 2024-12-10 PROCEDURE — 96374 THER/PROPH/DIAG INJ IV PUSH: CPT

## 2024-12-10 PROCEDURE — 85025 COMPLETE CBC W/AUTO DIFF WBC: CPT | Performed by: EMERGENCY MEDICINE

## 2024-12-10 PROCEDURE — 82962 GLUCOSE BLOOD TEST: CPT

## 2024-12-10 RX ORDER — NALOXONE HCL 0.4 MG/ML
0.4 VIAL (ML) INJECTION
Status: COMPLETED | OUTPATIENT
Start: 2024-12-10 | End: 2024-12-10

## 2024-12-10 RX ORDER — ACETAMINOPHEN 500 MG
1000 TABLET ORAL
Status: COMPLETED | OUTPATIENT
Start: 2024-12-10 | End: 2024-12-10

## 2024-12-10 RX ADMIN — ACETAMINOPHEN 1000 MG: 500 TABLET, FILM COATED ORAL at 03:12

## 2024-12-10 RX ADMIN — NALXONE HYDROCHLORIDE 0.4 MG: 0.4 INJECTION INTRAMUSCULAR; INTRAVENOUS; SUBCUTANEOUS at 03:12

## 2024-12-10 NOTE — ED NOTES
Chris Rogel, a 44 y.o. male presents to the ED after seizure, per bystander in snf house bathroom with patient. Pt states he remembers being on the toilet and waking up on the ground 2-3 mins later. Denies hx of seizures or feeling lightheaded/dizzy prior to episode. Pt states he takes gabapentin and trazadone daily. Denies other drug or alcohol use. Pt lethargic, endorses 9/10 pain to back of head and L shoulder from fall.     Pt resting comfortably. Connected to cardiac monitor, BP cuff, and pulse oximeter. ED workup in progress. Call light within reach. Safety measures in place. Denies further needs. Plan of care ongoing.      Chief Complaint   Patient presents with    Seizures     Review of patient's allergies indicates:   Allergen Reactions    Bee pollens      Past Medical History:   Diagnosis Date    Asthma     Neuropathy     Renal disorder     Stroke      Past Surgical History:   Procedure Laterality Date    ABDOMINAL SURGERY

## 2024-12-10 NOTE — DISCHARGE INSTRUCTIONS
"Take your anti-seizure medication as directed.   Do not drive, operate heavy machinery, work at heights, swim, take baths, or do any dangerous activity until cleared by a neurologist. No driving x6 months by [facility] state law. Avoid cooking on stovetop. If needed, use back burners. Microwaving preferred  For any activity, ask yourself "What would happen if I had a seizure while doing this?"     "

## 2024-12-10 NOTE — ED PROVIDER NOTES
"Encounter Date: 12/10/2024    SCRIBE #1 NOTE: I, Teresa Saldaña, am scribing for, and in the presence of,  Ryann Lozada MD. I have scribed the following portions of the note - Other sections scribed: HPI, ROS, PE.       History     Chief Complaint   Patient presents with    Seizures     44 year old male with PMHx of asthma, neuropathy presents to ED via EMS from a shelter for seizure like episodes. EMS received a call, where bystanders witnessed multiple seizure episodes. Upon EMS arrival, patient was drowsy but answering all questions, respiratory rate was 6, so 0.5 mg narcan was administered which improved RR to 12. Remaining vitals were within normal range. Patient here reports that he does not recall the event, states sitting on toilet and then waking up on floor. He complains of ongoing dizziness, headache and left shoulder pain. No tongue biting or urinary/bowel incontinence. He endorses taking trazodone, last dose was 2 days ago. No recent medications changes. No new numbness, weakness, tingling, nausea, vomiting, diarrhea, abdominal pain or urinary symptoms. He denies recent recreational drug use.       The history is provided by the patient and the EMS personnel. No  was used.     Review of patient's allergies indicates:   Allergen Reactions    Bee pollens      Past Medical History:   Diagnosis Date    Asthma     Neuropathy     Renal disorder     Stroke      Past Surgical History:   Procedure Laterality Date    ABDOMINAL SURGERY       No family history on file.  Social History     Tobacco Use    Smoking status: Every Day     Current packs/day: 0.50     Types: Cigarettes    Smokeless tobacco: Never   Substance Use Topics    Alcohol use: No    Drug use: Yes     Comment: patient reports taking "a lot of pain pills"     Review of Systems   Constitutional:  Negative for fever.   HENT:  Negative for sore throat.    Respiratory:  Negative for shortness of breath.    Cardiovascular:  Negative " for chest pain.   Gastrointestinal:  Negative for diarrhea, nausea and vomiting.   Genitourinary:  Negative for dysuria.   Musculoskeletal:  Negative for back pain.   Skin:  Negative for rash.   Neurological:  Negative for weakness.   Hematological:  Does not bruise/bleed easily.   All other systems reviewed and are negative.      Physical Exam     Initial Vitals   BP Pulse Resp Temp SpO2   12/10/24 0235 12/10/24 0235 12/10/24 0310 12/10/24 0240 12/10/24 0235   135/68 74 14 97.7 °F (36.5 °C) 100 %      MAP       --                Physical Exam    Constitutional: He appears well-developed and well-nourished. He does not have a sickly appearance. No distress. Nasal cannula in place.   HENT:   Head: Normocephalic and atraumatic.   Right Ear: External ear normal.   Left Ear: External ear normal.   Eyes: Conjunctivae, EOM and lids are normal. Pupils are equal, round, and reactive to light. Right eye exhibits no discharge. Left eye exhibits no discharge. Right conjunctiva is not injected. Right conjunctiva has no hemorrhage. Left conjunctiva is not injected. Left conjunctiva has no hemorrhage. No scleral icterus.   Neck: Phonation normal. No stridor present. No tracheal deviation present.   Normal range of motion.  Cardiovascular:  Normal rate, regular rhythm and normal heart sounds.     Exam reveals no friction rub.       No murmur heard.  Pulses:       Radial pulses are 2+ on the right side and 2+ on the left side.        Dorsalis pedis pulses are 2+ on the right side and 2+ on the left side.   Pulmonary/Chest: Breath sounds normal. No respiratory distress. He exhibits no tenderness.   Abdominal: Abdomen is soft. He exhibits no distension. There is no abdominal tenderness.   Musculoskeletal:      Cervical back: Normal range of motion.      Comments: No midline or paraspinal C/T/L spine tenderness. No step-offs.      Neurological: He is oriented to person, place, and time. He has normal strength. GCS eye subscore is 4.  GCS verbal subscore is 5. GCS motor subscore is 6.   Drowsy but answering all questions appropriately.   Skin: Skin is warm.   Psychiatric: He has a normal mood and affect. His speech is normal and behavior is normal. Judgment and thought content normal. Cognition and memory are normal.         ED Course   Procedures  Labs Reviewed   CBC W/ AUTO DIFFERENTIAL - Abnormal       Result Value    WBC 3.97      RBC 3.77 (*)     Hemoglobin 11.0 (*)     Hematocrit 32.9 (*)     MCV 87      MCH 29.2      MCHC 33.4      RDW 12.4      Platelets 165      MPV 11.7      Immature Granulocytes 0.0      Gran # (ANC) 2.7      Immature Grans (Abs) 0.00      Lymph # 0.7 (*)     Mono # 0.4      Eos # 0.1      Baso # 0.04      nRBC 0      Gran % 69.0      Lymph % 18.4      Mono % 10.3      Eosinophil % 1.3      Basophil % 1.0      Differential Method Automated     COMPREHENSIVE METABOLIC PANEL - Abnormal    Sodium 139      Potassium 4.2      Chloride 107      CO2 23      Glucose 102      BUN 21 (*)     Creatinine 1.2      Calcium 8.7      Total Protein 6.3      Albumin 4.2      Total Bilirubin 0.3      Alkaline Phosphatase 98      AST 26      ALT 26      eGFR >60      Anion Gap 9     POCT GLUCOSE - Abnormal    POCT Glucose 115 (*)    MAGNESIUM    Magnesium 1.8     DRUG SCREEN PANEL, URINE EMERGENCY    Benzodiazepines Negative      Methadone metabolites Negative      Cocaine (Metab.) Negative      Opiate Scrn, Ur Negative      Barbiturate Screen, Ur Negative      Amphetamine Screen, Ur Negative      THC Negative      Phencyclidine Negative      Creatinine, Urine 207.6      Toxicology Information SEE COMMENT      Narrative:     Specimen Source->Urine   ALCOHOL,MEDICAL (ETHANOL)    Alcohol, Serum <10     FENTANYL AND METABOLITE, URINE        ECG Results              EKG 12-lead (Final result)        Collection Time Result Time QRS Duration O[facility] QTC Calculation    12/10/24 02:42:29 12/10/24 08:15:52 96 418                     Final result by  Interface, Lab In [facility] (12/10/24 08:15:56)                   Narrative:    Test Reason : R56.9,    Vent. Rate :  65 BPM     Atrial Rate :  65 BPM     P-R Int : 140 ms          QRS Dur :  96 ms      QT Int : 402 ms       P-R-T Axes :  59  83  59 degrees    QTcB Int : 418 ms    Normal sinus rhythm  Moderate voltage criteria for LVH, may be normal variant  Early repolarization  Borderline Abnormal ECG    Confirmed by Addis Ramires ([id number]) on 12/10/2024 8:15:50 AM    Referred By: RAFAELAREFERRAL SELF           Confirmed By: Addis Ramires                                  Imaging Results              CT Head Without Contrast (Final result)  Result time 12/10/24 04:17:24      Final result by Sabrina Lopez MD (12/10/24 04:17:24)                   Impression:      No CT evidence of acute intracranial abnormality. Clinical correlation and further evaluation as warranted.      Electronically signed by: Sabrina Lopez MD  Date:    12/10/2024  Time:    04:17               Narrative:    EXAMINATION:  CT HEAD WITHOUT CONTRAST    CLINICAL HISTORY:  Seizure, new-onset, no history of trauma;    TECHNIQUE:  Low dose axial images were obtained through the head.  Coronal and sagittal reformations were also performed. Contrast was not administered.    COMPARISON:  Head CT 12/18/2013    FINDINGS:  There is no acute intracranial hemorrhage, hydrocephalus, midline shift or mass effect. Gray-white matter differentiation appears maintained. The basal cisterns are patent. The visualized paranasal sinuses and mastoid air cells are clear of acute process.  The visualized bones of the calvarium demonstrate no acute osseous abnormality.                                       Medications   acetaminophen tablet 1,000 mg (1,000 mg Oral Given 12/10/24 0322)   naloxone 0.4 mg/mL injection 0.4 mg (0.4 mg Intravenous Given 12/10/24 0322)     Medical Decision Making    44-year-old male brought in by EMS after reportedly having  witnessed seizure-like  activity at the [facility] house where he lives.  This was not witnessed by EMS.  According to them he had a positive response to Narcan.  On my initial assessment he was drowsy but answering questions appropriately and alert and oriented x3.   Narcan 0.4 mg given with no change in his mental status.   Given that the patient denies any history of seizures, will obtain CT of the head and labs as well as EKG and observe on a cardiac monitor.    CT of the head showed no evidence of acute process. CBC significant for mild anemia with no leukocytosis.  Chemistry without any significant abnormalities.  Alcohol less than 10, glucose normal,  andurine drug screen negative.  EKG without evidence of dysrhythmia.    Amount and/or Complexity of Data Reviewed  Labs: ordered. Decision-making details documented in ED Course.  Radiology: ordered. Decision-making details documented in ED Course.    Risk  OTC drugs.  Prescription drug management.            Scribe Attestation:   Scribe #1: I performed the above scribed service and the documentation accurately describes the services I performed. I attest to the accuracy of the note.        ED Course as of 12/10/24 2245   Tue Dec 10, 2024   0439 On re-evaluation the patient is resting comfortably.  He continued to report feeling drowsy but denies any other complaints at this time.  He states he no longer is experiencing the dizziness he had when he arrived. [AA]   0624 On re-evaluation the patient is resting comfortably.  He awakens easily to voice and denies any complaints.  However, he still too drowsy to discharge.  Will observe and plan to discharge after breakfast.  Seizure discharge precautions has been reviewed with the patient as well as the plan to have him follow up with Neurology for further evaluation.  He verbalized understanding and agreement with this plan.  His care will be transferred to the 6:00 a.m. physician at this time. [AA]   0724 [place] of Care Note:  Discussed  pt and plan with prior team.   Pt pending pt being fully awake, alert for discharge.  [RC]   0735 Patient awake, alert, conversant, with steady gait, no signs of acute intoxication or withdrawal. He is to f/u with neuro.   --  I discussed with pt and/or guardian/caretaker that this evaluation in the ED does not suggest any emergent or life threatening medical condition requiring admission or further immediate intervention or diagnostics. Regardless, an unremarkable evaluation in the ED does not preclude the development or presence of a serious or life threatening condition. Pt was instructed to return for any worsening, new, changed, or concerning symptoms.     I had a detailed discussion with patient and/or guardian/caretaker regarding findings, plan, return precautions, importance of medication adherence, need to follow-up with a PCP and specialist. All questions answered.     Note was created using voice recognition software. It may have occasional typographical errors not identified and edited despite initial review prior to signing.   [RC]   0808 Patient reported clicking of his thumb that has been ongoing for years.  He denies any new injury.  Imaging is not indicated today, but we will refer to hand specialist.  [RC]      ED Course User Index  [AA] Ryann Lozada MD  [RC] Alexis Muñoz MD                       Physician Attestation for Scribe: I, Ryann Lozada MD, reviewed documentation as scribed in my presence, which is both accurate and complete.      Clinical Impression:  Final diagnoses:  [R56.9] Seizure-like activity (Primary)  [M24.849] Thumb joint locking          ED Disposition Condition    Discharge Good          ED Prescriptions    None       Follow-up Information       Follow up With Specialties Details Why Contact Info    Chago Clay MD Family Medicine   [address] [facility]  [address]  [place] [id number] [phone]      [facility] Emergency Medicine Go to  If symptoms worsen  [address]  [facility] [address]  [phone]             Ryann Lozada MD  12/10/24 [id number]

## 2024-12-16 LAB
FENTANYL UR CFM-MCNC: NOT DETECTED NG/ML
NORFENTANYL UR CFM-MCNC: NOT DETECTED NG/ML
TOXICOLOGIST REVIEW: NORMAL